# Patient Record
Sex: MALE | Race: WHITE
[De-identification: names, ages, dates, MRNs, and addresses within clinical notes are randomized per-mention and may not be internally consistent; named-entity substitution may affect disease eponyms.]

---

## 2020-04-06 ENCOUNTER — HOSPITAL ENCOUNTER (EMERGENCY)
Dept: HOSPITAL 46 - ED | Age: 25
Discharge: HOME | End: 2020-04-06
Payer: COMMERCIAL

## 2020-04-06 VITALS — WEIGHT: 150 LBS | HEIGHT: 75 IN | BODY MASS INDEX: 18.65 KG/M2

## 2020-04-06 DIAGNOSIS — F19.10: Primary | ICD-10-CM

## 2020-04-06 DIAGNOSIS — F17.200: ICD-10-CM

## 2020-04-06 PROCEDURE — G0480 DRUG TEST DEF 1-7 CLASSES: HCPCS

## 2020-04-18 ENCOUNTER — HOSPITAL ENCOUNTER (EMERGENCY)
Dept: HOSPITAL 46 - ED | Age: 25
Discharge: HOME | End: 2020-04-18
Payer: COMMERCIAL

## 2020-04-18 VITALS — HEIGHT: 75 IN | WEIGHT: 150 LBS | BODY MASS INDEX: 18.65 KG/M2

## 2020-04-18 DIAGNOSIS — R10.9: Primary | ICD-10-CM

## 2020-04-18 DIAGNOSIS — F17.200: ICD-10-CM

## 2020-04-18 DIAGNOSIS — F19.10: ICD-10-CM

## 2020-04-18 DIAGNOSIS — F10.10: ICD-10-CM
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
